# Patient Record
Sex: MALE | HISPANIC OR LATINO | URBAN - METROPOLITAN AREA
[De-identification: names, ages, dates, MRNs, and addresses within clinical notes are randomized per-mention and may not be internally consistent; named-entity substitution may affect disease eponyms.]

---

## 2020-02-08 ENCOUNTER — APPOINTMENT (RX ONLY)
Dept: URBAN - METROPOLITAN AREA CLINIC 93 | Facility: CLINIC | Age: 40
Setting detail: DERMATOLOGY
End: 2020-02-08

## 2020-02-08 DIAGNOSIS — B35.1 TINEA UNGUIUM: ICD-10-CM

## 2020-02-08 DIAGNOSIS — L30.9 DERMATITIS, UNSPECIFIED: ICD-10-CM

## 2020-02-08 DIAGNOSIS — L259 CONTACT DERMATITIS AND OTHER ECZEMA, UNSPECIFIED CAUSE: ICD-10-CM

## 2020-02-08 PROBLEM — L23.9 ALLERGIC CONTACT DERMATITIS, UNSPECIFIED CAUSE: Status: ACTIVE | Noted: 2020-02-08

## 2020-02-08 PROCEDURE — ? PRESCRIPTION

## 2020-02-08 PROCEDURE — ? ADDITIONAL NOTES

## 2020-02-08 PROCEDURE — ? COUNSELING

## 2020-02-08 PROCEDURE — 99202 OFFICE O/P NEW SF 15 MIN: CPT

## 2020-02-08 RX ORDER — KETOCONAZOLE 20 MG/G
CREAM TOPICAL
Qty: 1 | Refills: 3 | Status: ERX | COMMUNITY
Start: 2020-02-08

## 2020-02-08 RX ORDER — TRIAMCINOLONE ACETONIDE 1 MG/G
OINTMENT TOPICAL BID
Qty: 1 | Refills: 2 | Status: ERX | COMMUNITY
Start: 2020-02-08

## 2020-02-08 RX ADMIN — TRIAMCINOLONE ACETONIDE: 1 OINTMENT TOPICAL at 00:00

## 2020-02-08 RX ADMIN — KETOCONAZOLE: 20 CREAM TOPICAL at 00:00

## 2020-02-08 ASSESSMENT — LOCATION SIMPLE DESCRIPTION DERM: LOCATION SIMPLE: LEFT GREAT TOE

## 2020-02-08 ASSESSMENT — LOCATION ZONE DERM: LOCATION ZONE: TOE

## 2020-02-08 ASSESSMENT — LOCATION DETAILED DESCRIPTION DERM: LOCATION DETAILED: LEFT DORSAL GREAT TOE

## 2020-02-08 NOTE — PROCEDURE: ADDITIONAL NOTES
Detail Level: Simple
Additional Notes: Patient requesting provider sign forms to excuse him from shaving at work. He demonstrates no evidence of folliculitis or irritation on exam and initially denies any history. On further questioning he does say he experiences irritation at times with shaving. As he has no evidence of a medical condition that would prohibit him from shaving the forms were not signed today. Instructed patient to RTC if shaving causes any irritation or folliculitis and forms can be filled out and signed at that time
Additional Notes: May be secondary to shoes do to tight fitting nature as it is on bilateral 5th toes. Counseled patient and RTC if no improvement with topicals

## 2020-02-08 NOTE — HPI: BUMPS
How Severe Are Your Bumps?: moderate
Have Your Bumps Been Treated?: not been treated
Is This A New Presentation, Or A Follow-Up?: Bumps
Additional History: Patient needs a note for work to have his beard.